# Patient Record
Sex: FEMALE | Employment: FULL TIME | URBAN - METROPOLITAN AREA
[De-identification: names, ages, dates, MRNs, and addresses within clinical notes are randomized per-mention and may not be internally consistent; named-entity substitution may affect disease eponyms.]

---

## 2020-11-28 ENCOUNTER — OFFICE VISIT (OUTPATIENT)
Dept: URGENT CARE | Facility: CLINIC | Age: 60
End: 2020-11-28
Payer: COMMERCIAL

## 2020-11-28 VITALS
TEMPERATURE: 98.8 F | HEIGHT: 62 IN | HEART RATE: 85 BPM | WEIGHT: 121 LBS | OXYGEN SATURATION: 94 % | SYSTOLIC BLOOD PRESSURE: 104 MMHG | BODY MASS INDEX: 22.26 KG/M2 | RESPIRATION RATE: 16 BRPM | DIASTOLIC BLOOD PRESSURE: 64 MMHG

## 2020-11-28 DIAGNOSIS — H10.33 ACUTE CONJUNCTIVITIS OF BOTH EYES, UNSPECIFIED ACUTE CONJUNCTIVITIS TYPE: ICD-10-CM

## 2020-11-28 DIAGNOSIS — L30.9 PERIORBITAL DERMATITIS: ICD-10-CM

## 2020-11-28 PROCEDURE — 99204 OFFICE O/P NEW MOD 45 MIN: CPT | Performed by: NURSE PRACTITIONER

## 2020-11-28 RX ORDER — METHYLPREDNISOLONE 4 MG/1
TABLET ORAL
Qty: 21 TAB | Refills: 0 | Status: SHIPPED | OUTPATIENT
Start: 2020-11-28 | End: 2021-01-01

## 2020-11-28 RX ORDER — POLYMYXIN B SULFATE AND TRIMETHOPRIM 1; 10000 MG/ML; [USP'U]/ML
1 SOLUTION OPHTHALMIC 4 TIMES DAILY
Qty: 10 ML | Refills: 0 | Status: SHIPPED | OUTPATIENT
Start: 2020-11-28 | End: 2020-12-05

## 2020-11-28 RX ORDER — DIPHENHYDRAMINE HCL 25 MG
25 TABLET ORAL EVERY 6 HOURS PRN
COMMUNITY

## 2020-11-28 ASSESSMENT — ENCOUNTER SYMPTOMS
NAUSEA: 0
MEMORY LOSS: 0
ANOREXIA: 0
VOMITING: 0
MYALGIAS: 0
NECK PAIN: 0
EYE REDNESS: 1
COUGH: 0
HEADACHES: 0
EYE PAIN: 0
WHEEZING: 0
SORE THROAT: 0
BACK PAIN: 0
ORTHOPNEA: 0
HEARTBURN: 0
FEVER: 0
DOUBLE VISION: 0
DIZZINESS: 0
EYE DISCHARGE: 0
CHILLS: 0
DIARRHEA: 0
PHOTOPHOBIA: 0
PALPITATIONS: 0
BLURRED VISION: 0
CONSTIPATION: 0
SHORTNESS OF BREATH: 0
TINGLING: 0

## 2020-11-28 ASSESSMENT — VISUAL ACUITY: OU: 1

## 2020-11-28 NOTE — PROGRESS NOTES
Subjective:      Yanet Joseph is a 60 y.o. female who presents with Rash (Bilat. swollen eyelids with a rash x 3 weeks.)        Patient presents to urgent care with complaint of rash on her bilateral eyelids which has been ongoing for 3 weeks.  Patient states the rash started gradually and is itchy and burning.  Patient has been applying topical vitamin E since it first began and the rash seems to be worsening.  He denies any history of eczema.  She cannot endorse any new products although shampoo she does change back and forth between as she lives here and in [place].  She will be returning to [place] tomorrow.  She denies any visual change.  There has been no discharge.  She has no associated symptoms.    Rash  This is a new problem. The current episode started 1 to 4 weeks ago. The problem has been gradually worsening since onset. The affected locations include the left eye and right eye. The rash is characterized by burning, dryness, redness and swelling. It is unknown (Possible vitamin E?) if there was an exposure to a precipitant. Pertinent negatives include no anorexia, congestion, cough, diarrhea, eye pain, facial edema, fever, joint pain, shortness of breath, sore throat or vomiting.       Review of Systems   Constitutional: Negative for chills, fever and malaise/fatigue.   HENT: Negative for congestion, ear pain and sore throat.    Eyes: Positive for redness. Negative for blurred vision, double vision, photophobia, pain and discharge.   Respiratory: Negative for cough, shortness of breath and wheezing.    Cardiovascular: Negative for chest pain, palpitations, orthopnea and leg swelling.   Gastrointestinal: Negative for anorexia, constipation, diarrhea, heartburn, nausea and vomiting.   Musculoskeletal: Negative for back pain, joint pain, myalgias and neck pain.   Skin: Positive for rash.   Neurological: Negative for dizziness, tingling and headaches.   Psychiatric/Behavioral: Negative for memory loss  "and suicidal ideas.   All other systems reviewed and are negative.    PMH:  has no past medical history on file.  MEDS:   Current Outpatient Medications:   •  diphenhydrAMINE (BENADRYL) 25 MG Tab, Take 25 mg by mouth every 6 hours as needed for Sleep., Disp: , Rfl:   •  polymixin-trimethoprim (POLYTRIM) 71346-5.1 UNIT/ML-% Solution, Administer 1 Drop into both eyes 4 times a day for 7 days., Disp: 10 mL, Rfl: 0  •  methylPREDNISolone (MEDROL DOSEPAK) 4 MG Tablet Therapy Pack, Follow schedule on package instructions., Disp: 21 Tab, Rfl: 0  ALLERGIES: Not on File  SURGHX: History reviewed. No pertinent surgical history.  SOCHX:  reports that she has never smoked. She has never used smokeless tobacco. She reports current alcohol use. She reports that she does not use drugs.  FH: Reviewed with patient, not pertinent to this visit.        Objective:     /64 (BP Location: Left arm, Patient Position: Sitting, BP Cuff Size: Adult)   Pulse 85   Temp 37.1 °C (98.8 °F) (Temporal)   Resp 16   Ht 1.575 m (5' 2\")   Wt 54.9 kg (121 lb)   SpO2 94%   BMI 22.13 kg/m²      Physical Exam  Vitals signs reviewed.   Constitutional:       Appearance: Normal appearance.   HENT:      Head: Normocephalic.      Right Ear: External ear normal.      Left Ear: External ear normal.      Nose: Nose normal. No congestion.      Mouth/Throat:      Mouth: Mucous membranes are moist.   Eyes:      General: Lids are normal. Vision grossly intact. Gaze aligned appropriately. No visual field deficit.        Right eye: No discharge or hordeolum.         Left eye: No discharge or hordeolum.      Extraocular Movements: Extraocular movements intact.      Conjunctiva/sclera:      Right eye: Right conjunctiva is injected.      Left eye: Left conjunctiva is injected.     Neck:      Musculoskeletal: Normal range of motion.   Cardiovascular:      Rate and Rhythm: Normal rate and regular rhythm.      Pulses: Normal pulses.      Heart sounds: Normal heart " sounds. No murmur.   Pulmonary:      Effort: Pulmonary effort is normal.      Breath sounds: Normal breath sounds. No wheezing.   Abdominal:      General: Abdomen is flat. Bowel sounds are normal.      Palpations: Abdomen is soft.      Tenderness: There is no abdominal tenderness.   Musculoskeletal: Normal range of motion.   Lymphadenopathy:      Cervical: No cervical adenopathy.   Skin:     General: Skin is warm and dry.      Capillary Refill: Capillary refill takes less than 2 seconds.      Findings: Rash present. Rash is scaling.   Neurological:      Mental Status: She is alert and oriented to person, place, and time.   Psychiatric:         Mood and Affect: Mood normal.         Behavior: Behavior normal.                 Assessment/Plan:        1. Periorbital dermatitis  polymixin-trimethoprim (POLYTRIM) 60382-7.1 UNIT/ML-% Solution    methylPREDNISolone (MEDROL DOSEPAK) 4 MG Tablet Therapy Pack   2. Acute conjunctivitis of both eyes, unspecified acute conjunctivitis type  polymixin-trimethoprim (POLYTRIM) 39943-6.1 UNIT/ML-% Solution    methylPREDNISolone (MEDROL DOSEPAK) 4 MG Tablet Therapy Pack     Differential diagnosis, natural history, supportive care, and indications for immediate follow-up discussed at length. Stop Vitamin E and start using vaseline instead.  Avoid fragrances.  Suspect chemical irritant of some sort.      Plan of care, medications and treatments reviewed with patient and or guardian.  Patient and or guardian voices understanding and agrees with the instructions provided. After visit summary reviewed with patient. Patient and or guardian understand the parameters for reevaluation and ER precautions discussed.     Follow up with primary care provider in the next 1-5 days for recheck as needed. Patient has referral to ophthalmology from PCP.    Discussed that urgent care setting has limited resources, therefore any worsening of symptoms should be evaluated in the ER. Patient and or guardian  verbalized understanding.     Please note that this dictation was created using voice recognition software. I have made every reasonable attempt to correct obvious errors, but I expect that there are errors of grammar and possibly content that I did not discover before finalizing the note.

## 2021-01-01 ENCOUNTER — OFFICE VISIT (OUTPATIENT)
Dept: URGENT CARE | Facility: CLINIC | Age: 61
End: 2021-01-01
Payer: COMMERCIAL

## 2021-01-01 VITALS
OXYGEN SATURATION: 98 % | HEIGHT: 62 IN | WEIGHT: 127.8 LBS | DIASTOLIC BLOOD PRESSURE: 64 MMHG | HEART RATE: 72 BPM | RESPIRATION RATE: 16 BRPM | BODY MASS INDEX: 23.52 KG/M2 | SYSTOLIC BLOOD PRESSURE: 104 MMHG | TEMPERATURE: 97.4 F

## 2021-01-01 DIAGNOSIS — L30.9 PERIORBITAL DERMATITIS: ICD-10-CM

## 2021-01-01 DIAGNOSIS — L30.9 DERMATITIS: ICD-10-CM

## 2021-01-01 PROCEDURE — 99213 OFFICE O/P EST LOW 20 MIN: CPT | Performed by: NURSE PRACTITIONER

## 2021-01-01 RX ORDER — PREDNISONE 10 MG/1
10 TABLET ORAL DAILY
Qty: 30 TAB | Refills: 0 | Status: SHIPPED | OUTPATIENT
Start: 2021-01-01 | End: 2021-01-16

## 2021-01-01 SDOH — HEALTH STABILITY: MENTAL HEALTH: HOW OFTEN DO YOU HAVE A DRINK CONTAINING ALCOHOL?: 4 OR MORE TIMES A WEEK

## 2021-01-01 ASSESSMENT — ENCOUNTER SYMPTOMS
BLURRED VISION: 0
NAUSEA: 0
COUGH: 0
SHORTNESS OF BREATH: 0
MYALGIAS: 0
VOMITING: 0
EYE REDNESS: 1
EYE DISCHARGE: 0
FEVER: 0
DIZZINESS: 0
SORE THROAT: 0
DOUBLE VISION: 0
EYE PAIN: 1
PHOTOPHOBIA: 0
CHILLS: 0

## 2021-01-01 ASSESSMENT — VISUAL ACUITY: OU: 1

## 2021-01-01 NOTE — PROGRESS NOTES
Subjective:   Yanet Joseph is a 60 y.o. female who presents for Blepharitis (x1mnth, bilateral, painful, itchiness, not sure if vision is affected)      Rash  This is a recurrent problem. The current episode started more than 1 month ago (Was seen 11/28 for similar symptoms was started on oral steroids did have improvement.  Symptoms quickly resolved turned after steroids are stopped.  Was placed on a mid third 16-day taper by PCP finished prescription 3 days ago symptoms returned). The problem has been waxing and waning since onset. The affected locations include the face and neck. The rash is characterized by redness and itchiness. It is unknown if there was an exposure to a precipitant. Associated symptoms include eye pain (itching ). Pertinent negatives include no cough, facial edema, fever, shortness of breath, sore throat or vomiting. Past treatments include oral steroids and antihistamine. The treatment provided mild (Symptoms improved while on oral steroids.  Quickly return as soon as steroids are stopped) relief. Her past medical history is significant for allergies (cats). There is no history of eczema.       Review of Systems   Constitutional: Negative for chills and fever.   HENT: Negative for sore throat.    Eyes: Positive for pain (itching ) and redness. Negative for blurred vision, double vision, photophobia and discharge.   Respiratory: Negative for cough and shortness of breath.    Cardiovascular: Negative for chest pain.   Gastrointestinal: Negative for nausea and vomiting.   Genitourinary: Negative for dysuria.   Musculoskeletal: Negative for myalgias.   Skin: Positive for itching and rash.   Neurological: Negative for dizziness.       Medications:    • diphenhydrAMINE Tabs  • predniSONE Tabs    Allergies: Patient has no known allergies.    Problem List: Yanet Joseph does not have a problem list on file.    Surgical History:  No past surgical history on file.    Past Social Hx: Yanet Joseph   "reports that she has never smoked. She has never used smokeless tobacco. She reports current alcohol use. She reports that she does not use drugs.     Past Family Hx:  Yanet Joseph family history is not on file.     Problem list, medications, and allergies reviewed by myself today in Epic.     Objective:     /64   Pulse 72   Temp 36.3 °C (97.4 °F) (Temporal)   Resp 16   Ht 1.575 m (5' 2\")   Wt 58 kg (127 lb 12.8 oz)   SpO2 98%   BMI 23.37 kg/m²     Physical Exam  Constitutional:       Appearance: Normal appearance. She is not ill-appearing or toxic-appearing.   HENT:      Head: Normocephalic. Right periorbital erythema (Nontender to palpation, ) and left periorbital erythema (Nontender to palpation, ) present.        Right Ear: External ear normal.      Left Ear: External ear normal.      Nose: Nose normal.      Mouth/Throat:      Lips: Pink.      Mouth: Mucous membranes are moist.   Eyes:      General: Lids are normal. Vision grossly intact.         Right eye: No discharge.         Left eye: No discharge.      Conjunctiva/sclera: Conjunctivae normal.   Neck:      Musculoskeletal: Full passive range of motion without pain.   Pulmonary:      Effort: Pulmonary effort is normal. No accessory muscle usage or respiratory distress.   Musculoskeletal: Normal range of motion.   Skin:     Coloration: Skin is not pale.      Findings: Rash present.          Neurological:      Mental Status: She is alert and oriented to person, place, and time.   Psychiatric:         Mood and Affect: Mood normal.         Thought Content: Thought content normal.         Assessment/Plan:     Diagnosis and associated orders:     1. Periorbital dermatitis  predniSONE (DELTASONE) 10 MG Tab    REFERRAL TO ALLERGY    REFERRAL TO DERMATOLOGY   2. Dermatitis  predniSONE (DELTASONE) 10 MG Tab    REFERRAL TO ALLERGY    REFERRAL TO DERMATOLOGY      Comments/MDM:     • Patient is a 60-year-old female return to clinic with the stated above.  " Patient having an ongoing what appears to be an irritant rash, she is unsure what is attributing to her symptoms.  She has eliminated all facial cleansers, shampoos, detergents with no improvement did recommend switching to all Neutrogena products.  She has been on 2 courses of oral steroids.  She did start oral Zyrtec advised to continue taking as directed.  Patient will be restarted on a taper of steroids urgent referral to dermatology and allergy will be placed.  Patient is scheduled to see her PCP Tuesday of next week.  Advised to follow-up as scheduled.        Please note that this dictation was created using voice recognition software. I have made a reasonable attempt to correct obvious errors, but I expect that there are errors of grammar and possibly content that I did not discover before finalizing the note.    This note was electronically signed by Lex LIU.